# Patient Record
Sex: FEMALE | Race: WHITE | URBAN - METROPOLITAN AREA
[De-identification: names, ages, dates, MRNs, and addresses within clinical notes are randomized per-mention and may not be internally consistent; named-entity substitution may affect disease eponyms.]

---

## 2017-10-18 ENCOUNTER — TRANSFERRED RECORDS (OUTPATIENT)
Dept: HEALTH INFORMATION MANAGEMENT | Facility: CLINIC | Age: 62
End: 2017-10-18

## 2017-11-11 ENCOUNTER — TRANSFERRED RECORDS (OUTPATIENT)
Dept: HEALTH INFORMATION MANAGEMENT | Facility: CLINIC | Age: 62
End: 2017-11-11

## 2017-11-20 ENCOUNTER — MEDICAL CORRESPONDENCE (OUTPATIENT)
Dept: HEALTH INFORMATION MANAGEMENT | Facility: CLINIC | Age: 62
End: 2017-11-20

## 2017-12-18 NOTE — TELEPHONE ENCOUNTER
APPT INFO    Date /Time: 12/22/17 7:15AM   Reason for Appt: R Rotator Cuff Separation    Ref Provider/Clinic: Ursula Ross PA-C   Are there internal records? Yes/No?  IF YES, list clinic names: no   Are there outside records? Yes/No? Yes -- see below   Patient Contact (Y/N) & Call Details: No, pt is referred   Action: CareEverywhere records reviewed. See CareEverywhere Tab.       OUTSIDE RECORDS CHECKLIST     CLINIC NAME COMMENTS REC (x) IMG (x)   [facility]/ [facility] ** Records available to view in Care Everywhere Tab x x

## 2017-12-18 NOTE — TELEPHONE ENCOUNTER
Records Received From: [facility]/ [facility] -- Care Everywhere    Date/Exam/Location  (specify location if different)   Office Notes: 10/18/17, 11/7/17   Radiology Reports: MRI R Shoulder 11/11/17  MRI L Shoulder 11/11/17   Missing: Need imaging pushed

## 2017-12-18 NOTE — TELEPHONE ENCOUNTER
Received Imaging From: [facility]    Image Type (x): Disc:___  Pacs:_x__      Exam Date/Name: MRI L Shoulder 11/11/17  MRI R Shoulder 11/11/17 Comments: matthewied Jeanna sanders

## 2017-12-21 DIAGNOSIS — G89.29 CHRONIC RIGHT SHOULDER PAIN: Primary | ICD-10-CM

## 2017-12-21 DIAGNOSIS — M25.511 CHRONIC RIGHT SHOULDER PAIN: Primary | ICD-10-CM

## 2017-12-22 ENCOUNTER — PRE VISIT (OUTPATIENT)
Dept: ORTHOPEDICS | Facility: CLINIC | Age: 62
End: 2017-12-22

## 2017-12-22 ENCOUNTER — RADIANT APPOINTMENT (OUTPATIENT)
Dept: GENERAL RADIOLOGY | Facility: CLINIC | Age: 62
End: 2017-12-22
Attending: ORTHOPAEDIC SURGERY
Payer: COMMERCIAL

## 2017-12-22 ENCOUNTER — OFFICE VISIT (OUTPATIENT)
Dept: ORTHOPEDICS | Facility: CLINIC | Age: 62
End: 2017-12-22
Payer: COMMERCIAL

## 2017-12-22 VITALS — WEIGHT: 160.1 LBS | BODY MASS INDEX: 25.73 KG/M2 | HEIGHT: 66 IN

## 2017-12-22 DIAGNOSIS — M25.511 CHRONIC RIGHT SHOULDER PAIN: ICD-10-CM

## 2017-12-22 DIAGNOSIS — G89.29 CHRONIC RIGHT SHOULDER PAIN: ICD-10-CM

## 2017-12-22 DIAGNOSIS — M67.919 DISORDER OF BURSAE AND TENDONS IN SHOULDER REGION: Primary | ICD-10-CM

## 2017-12-22 DIAGNOSIS — M75.121 COMPLETE TEAR OF RIGHT ROTATOR CUFF: ICD-10-CM

## 2017-12-22 DIAGNOSIS — M71.9 DISORDER OF BURSAE AND TENDONS IN SHOULDER REGION: Primary | ICD-10-CM

## 2017-12-22 RX ORDER — ESTRADIOL 0.03 MG/D
1 PATCH TRANSDERMAL
COMMUNITY
Start: 2017-11-07

## 2017-12-22 RX ORDER — ALBUTEROL SULFATE 90 UG/1
2 AEROSOL, METERED RESPIRATORY (INHALATION)
COMMUNITY
Start: 2017-10-18

## 2017-12-22 ASSESSMENT — ENCOUNTER SYMPTOMS
INCREASED ENERGY: 0
DOUBLE VISION: 0
DECREASED APPETITE: 0
POLYPHAGIA: 0
EYE REDNESS: 1
FEVER: 0
FATIGUE: 1
CHILLS: 0
WEIGHT GAIN: 0
EYE IRRITATION: 1
WEIGHT LOSS: 0
POLYDIPSIA: 0
EYE WATERING: 1
ALTERED TEMPERATURE REGULATION: 1
HALLUCINATIONS: 0
NIGHT SWEATS: 0
EYE PAIN: 0

## 2017-12-22 NOTE — PROGRESS NOTES
CHIEF CONCERN:  Bilateral shoulder pain    HISTORY OF PRESENT ILLNESS: Ms. Bustamante is a 62-year-old right-hand-dominant female who I am seeing today for bilateral shoulder issues right greater than left.  She states that she fell off a ladder in 2009 and landed on her right shoulder.  She says that is when her symptoms started.  She did not ever see anyone for this as she did not have insurance previously.  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  She finds that if she has to paint or work with her arm above shoulder height for a time it gets very sore and weak.  She gives a history of having dislocated both shoulders at some point in the past, many years ago.  She has not had recurrent instability in either shoulder of late.  She gives a SANE score of 50 for the right and 90 for the left.    PAST MEDICAL HISTORY:  1. Hypercholesterolemia  2. Asthma    No past surgical history on file.    Current Outpatient Prescriptions   Medication Sig Dispense Refill     estradiol (FEMPATCH) 0.025 MG/24HR PTWK WK patch 1 patch       albuterol (PROAIR HFA/PROVENTIL HFA/VENTOLIN HFA) 108 (90 BASE) MCG/ACT Inhaler Inhale 2 puffs into the lungs       ALLERGIES: NKDA    SOCIAL HISTORY:    Social History     Social History     Marital status: Single     Spouse name: N/A     Number of children: N/A     Years of education: N/A     Occupational History     Not on file.     Social History Main Topics     Smoking status: Never Smoker     Smokeless tobacco: Never Used     Alcohol use Not on file     Drug use: Not on file     Sexual activity: Not on file     Other Topics Concern     Not on file     Social History Narrative     No narrative on file     FAMILY HISTORY: Negative per patient intake form which is reviewed and scanned into the chart     REVIEW OF SYSTEMS: Positive for that noted in past medical history and history of present illness and  "otherwise negative on patient intake form scanned into the chart and reviewed.     PHYSICAL EXAM:    Adult female in NAD  Ht 1.67 m (5' 5.75\")  Wt 72.6 kg (160 lb 1.6 oz)  BMI 26.04 kg/m2  Respirations even and unlabored  Focused upper extremity exam: Skin intact. No erythema. Sensation intact all dermatomes into the hand to light touch. EPL, FPL, and Intrinsics intact. Right shoulder active motion is FE to 180, ER at side to 70, and IR to T8. Left shoulder active motion is FE to 180, ER to 80, and IR to T8.  Positive Neer and Cooper on right. No pain on palpation over the AC joint (bilat). Mild pain on palpation over the long head of the biceps on the R. Strength testing in FE in 4+/5 on the R and 5/5 on the L, in ER is 4/5on R and 5/5 on the L.    IMAGING:  Left shoulder MRI 11/11/17 done at [facility] was done without any coronal images. However, there does not appear to be any evidence for rotator cuff tear. There is a mild chronic Hill-Sachs lesion suggestive of prior instability event. No bony bankart or obvious labral tear on this non-arthrographic study    Right shoulder MRI 11/11/17 at [facility] demonstrates a full thickness retracted tear of the supraspinatus and infraspinatus with associated grade 3-4 atrophy in both. There is no tear or atrophy of the subscapularis or teres minor. Paa-Ko-Sachs lesion noted. Moderate AC arthrosis noted. Long head biceps tendinopathy.    Right shoulder xrays today demonstrate a very small inferior osteophyte suggestive of early GH OA. No fracture. Mild AC OA.    ASSESSMENT:    1. Right full thickness rotator cuff tear (supraspinatus and infraspinatus, both with grade 3-4 atrophy)  2. History (remote) of possible bilateral shoulder instability event    PLAN:  I reviewed the imaging and exam findings for the patient using lay language. I discussed there is no tear on the Left. The rotator cuff tear on the right is large, retracted, and quite atrophied. I " discussed that the atrophy gives me concern that this is not repairable. I reviewed that if the tendon were indeed repairable she may not have improvement in strength as cuff atrophy is generally not reversible. Currently her AROM is quite good. Given her age, mild OA and current AROM I do not think she is a candidate for SCR or tendon transfer. We discussed that if OA progresses or she develops pseudoparalysis she could consider reverse TSA. She expressed understanding and will follow up prn.    Jodee Boo MD    Answers for HPI/ROS submitted by the patient on 12/22/2017   General Symptoms: Yes  Skin Symptoms: No  HENT Symptoms: No  EYE SYMPTOMS: Yes  HEART SYMPTOMS: No  LUNG SYMPTOMS: No  INTESTINAL SYMPTOMS: No  URINARY SYMPTOMS: No  GYNECOLOGIC SYMPTOMS: No  BREAST SYMPTOMS: No  SKELETAL SYMPTOMS: No  BLOOD SYMPTOMS: No  NERVOUS SYSTEM SYMPTOMS: No  MENTAL HEALTH SYMPTOMS: No  Fever: No  Loss of appetite: No  Weight loss: No  Weight gain: No  Fatigue: Yes  Night sweats: No  Chills: No  Increased stress: Yes  Excessive hunger: No  Excessive thirst: No  Feeling hot or cold when others believe the temperature is normal: Yes  Loss of height: No  Post-operative complications: No  Surgical site pain: No  Hallucinations: No  Change in or Loss of Energy: No  Hyperactivity: No  Confusion: Yes  Eye pain: No  Vision loss: No  Dry eyes: No  Watery eyes: Yes  Eye bulging: No  Double vision: No  Flashing of lights: No  Spots: No  Floaters: Yes  Redness: Yes  Crossed eyes: No  Tunnel Vision: No  Yellowing of eyes: No  Eye irritation: Yes

## 2017-12-22 NOTE — MR AVS SNAPSHOT
After Visit Summary   2017    Sarah Bustamante    MRN: [id number]           Patient Information     Date Of Birth          1955        Visit Information        Provider Department      2017 7:15 AM Jodee Boo MD [facility]        Today's Diagnoses     Disorder of bursae and tendons in shoulder region    -  1    Complete tear of right rotator cuff           Follow-ups after your visit        Follow-up notes from your care team     Return if symptoms worsen or fail to improve.      Who to contact     Please call your clinic at [phone] to:    Ask questions about your health    Make or cancel appointments    Discuss your medicines    Learn about your test results    Speak to your doctor   If you have compliments or concerns about an experience at your clinic, or if you wish to file a complaint, please contact [facility] Patient Relations at [phone] or email us at [email or web address]         Additional Information About Your Visit        MyChart Information     [employer] is an electronic gateway that provides easy, online access to your medical records. With [employer], you can request a clinic appointment, read your test results, renew a prescription or communicate with your care team.     To sign up for [employer] visit the website at [email or web address]   You will be asked to enter the access code listed below, as well as some personal information. Please follow the directions to create your username and password.     Your access code is: [email or web address]  Expires: 3/22/2018  8:33 AM     Your access code will  in 90 days. If you need help or a new code, please contact your [facility] Clinic or call [phone] for assistance.        Care EveryWhere ID     This is your Care EveryWhere ID. This could be used by other organizations to access your [place] medical records  [id number]       "  Your Vitals Were     Height BMI (Body Mass Index)                1.67 m (5' 5.75\") 26.04 kg/m2           Blood Pressure from Last 3 Encounters:   No data found for BP    Weight from Last 3 Encounters:   12/22/17 72.6 kg (160 lb 1.6 oz)              Today, you had the following     No orders found for display       Primary Care Provider Office Phone # Fax #    Ursula Ross PA-C [phone]       [place] [address]        Equal Access to Services     [facility]: Hadii aad ku hadasho Soomaali, waaxda luqadaha, qaybta kaalmada adeegyada, waxaria nick haysravanthi herrera . So [facility] [phone].    ATENCIÓN: Si habla español, tiene a mccormick disposición servicios gratuitos de asistencia lingüística. [facility] [phone].    We comply with applicable federal civil rights laws and [place] laws. We do not discriminate on the basis of race, color, national origin, age, disability, sex, sexual orientation, or gender identity.            Thank you!     Thank you for choosing [facility]  for your care. Our goal is always to provide you with excellent care. Hearing back from our patients is one way we can continue to improve our services. Please take a few minutes to complete the written survey that you may receive in the mail after your visit with us. Thank you!             Your Updated Medication List - Protect others around you: Learn how to safely use, store and throw away your medicines at www.disposemymeds.org.          This list is accurate as of: 12/22/17  8:33 AM.  Always use your most recent med list.                   Brand Name Dispense Instructions for use Diagnosis    albuterol 108 (90 BASE) MCG/ACT Inhaler    PROAIR HFA/PROVENTIL HFA/VENTOLIN HFA     Inhale 2 puffs into the lungs        estradiol 0.025 MG/24HR Ptwk WK patch    FEMPATCH     1 patch          "

## 2017-12-22 NOTE — LETTER
12/22/2017       RE: Sarah Bustamante  [address]  [facility] [address]     Dear Colleague,    Thank you for referring your patient, Sarah Bustamante, to the [facility] at [facility]. Please see a copy of my visit note below.    CHIEF CONCERN:  Bilateral shoulder pain    HISTORY OF PRESENT ILLNESS: Ms. Bustamante is a 62-year-old right-hand-dominant female who I am seeing today for bilateral shoulder issues right greater than left.  She states that she fell off a ladder in 2009 and landed on her right shoulder.  She says that is when her symptoms started.  She did not ever see anyone for this as she did not have insurance previously.  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  She finds that if she has to paint or work with her arm above shoulder height for a time it gets very sore and weak.  She gives a history of having dislocated both shoulders at some point in the past, many years ago.  She has not had recurrent instability in either shoulder of late.  She gives a SANE score of 50 for the right and 90 for the left.    PAST MEDICAL HISTORY:  1. Hypercholesterolemia  2. Asthma    No past surgical history on file.    Current Outpatient Prescriptions   Medication Sig Dispense Refill     estradiol (FEMPATCH) 0.025 MG/24HR PTWK WK patch 1 patch       albuterol (PROAIR HFA/PROVENTIL HFA/VENTOLIN HFA) 108 (90 BASE) MCG/ACT Inhaler Inhale 2 puffs into the lungs       ALLERGIES: NKDA    SOCIAL HISTORY:    Social History     Social History     Marital status: Single     Spouse name: N/A     Number of children: N/A     Years of education: N/A     Occupational History     Not on file.     Social History Main Topics     Smoking status: Never Smoker     Smokeless tobacco: Never Used     Alcohol use Not on file     Drug use: Not on file     Sexual activity: Not on file     Other Topics  "Concern     Not on file     Social History Narrative     No narrative on file     FAMILY HISTORY: Negative per patient intake form which is reviewed and scanned into the chart     REVIEW OF SYSTEMS: Positive for that noted in past medical history and history of present illness and otherwise negative on patient intake form scanned into the chart and reviewed.     PHYSICAL EXAM:    Adult female in NAD  Ht 1.67 m (5' 5.75\")  Wt 72.6 kg (160 lb 1.6 oz)  BMI 26.04 kg/m2  Respirations even and unlabored  Focused upper extremity exam: Skin intact. No erythema. Sensation intact all dermatomes into the hand to light touch. EPL, FPL, and Intrinsics intact. Right shoulder active motion is FE to 180, ER at side to 70, and IR to T8. Left shoulder active motion is FE to 180, ER to 80, and IR to T8.  Positive Neer and Cooper on right. No pain on palpation over the AC joint (bilat). Mild pain on palpation over the long head of the biceps on the R. Strength testing in FE in 4+/5 on the R and 5/5 on the L, in ER is 4/5on R and 5/5 on the L.    IMAGING:  Left shoulder MRI 11/11/17 done at [facility] was done without any coronal images. However, there does not appear to be any evidence for rotator cuff tear. There is a mild chronic Hill-Sachs lesion suggestive of prior instability event. No bony bankart or obvious labral tear on this non-arthrographic study    Right shoulder MRI 11/11/17 at [facility] demonstrates a full thickness retracted tear of the supraspinatus and infraspinatus with associated grade 3-4 atrophy in both. There is no tear or atrophy of the subscapularis or teres minor. Oden-Sachs lesion noted. Moderate AC arthrosis noted. Long head biceps tendinopathy.    Right shoulder xrays today demonstrate a very small inferior osteophyte suggestive of early GH OA. No fracture. Mild AC OA.    ASSESSMENT:    1. Right full thickness rotator cuff tear (supraspinatus and infraspinatus, both with grade 3-4 " atrophy)  2. History (remote) of possible bilateral shoulder instability event    PLAN:  I reviewed the imaging and exam findings for the patient using lay language. I discussed there is no tear on the Left. The rotator cuff tear on the right is large, retracted, and quite atrophied. I discussed that the atrophy gives me concern that this is not repairable. I reviewed that if the tendon were indeed repairable she may not have improvement in strength as cuff atrophy is generally not reversible. Currently her AROM is quite good. Given her age, mild OA and current AROM I do not think she is a candidate for SCR or tendon transfer. We discussed that if OA progresses or she develops pseudoparalysis she could consider reverse TSA. She expressed understanding and will follow up prn.    Answers for HPI/ROS submitted by the patient on 12/22/2017   General Symptoms: Yes  Skin Symptoms: No  HENT Symptoms: No  EYE SYMPTOMS: Yes  HEART SYMPTOMS: No  LUNG SYMPTOMS: No  INTESTINAL SYMPTOMS: No  URINARY SYMPTOMS: No  GYNECOLOGIC SYMPTOMS: No  BREAST SYMPTOMS: No  SKELETAL SYMPTOMS: No  BLOOD SYMPTOMS: No  NERVOUS SYSTEM SYMPTOMS: No  MENTAL HEALTH SYMPTOMS: No  Fever: No  Loss of appetite: No  Weight loss: No  Weight gain: No  Fatigue: Yes  Night sweats: No  Chills: No  Increased stress: Yes  Excessive hunger: No  Excessive thirst: No  Feeling hot or cold when others believe the temperature is normal: Yes  Loss of height: No  Post-operative complications: No  Surgical site pain: No  Hallucinations: No  Change in or Loss of Energy: No  Hyperactivity: No  Confusion: Yes  Eye pain: No  Vision loss: No  Dry eyes: No  Watery eyes: Yes  Eye bulging: No  Double vision: No  Flashing of lights: No  Spots: No  Floaters: Yes  Redness: Yes  Crossed eyes: No  Tunnel Vision: No  Yellowing of eyes: No  Eye irritation: Yes      Jodee Boo MD

## 2017-12-22 NOTE — NURSING NOTE
"Reason For Visit:   Chief Complaint   Patient presents with     Consult     Right rotator cuff separation.       PCP: Ursula Ross  Ref: Ursula Ross    ?  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  Work status?  Full time.  Date of injury: 2009  Type of injury: fell off a ladder and landed right on shoulder.  Date of surgery: NA    Smoker: No  Request smoking cessation information: No    Right hand dominant    SANE score  Affected shoulder: Right  Right shoulder SANE: 50  Left shoulder SANE: 90    Ht 1.67 m (5' 5.75\")  Wt 72.6 kg (160 lb 1.6 oz)  BMI 26.04 kg/m2      Pain Assessment  Patient Currently in Pain: Denies, just a stiff feeling    Sarah Luque ATC        "

## 2018-01-15 ENCOUNTER — HOSPITAL ENCOUNTER (OUTPATIENT)
Dept: MAMMOGRAPHY | Facility: CLINIC | Age: 63
Discharge: HOME | End: 2018-01-15
Attending: PHYSICIAN ASSISTANT | Admitting: PHYSICIAN ASSISTANT
Payer: COMMERCIAL

## 2018-01-15 DIAGNOSIS — Z12.31 VISIT FOR SCREENING MAMMOGRAM: ICD-10-CM

## 2018-01-15 PROCEDURE — 77063 BREAST TOMOSYNTHESIS BI: CPT
